# Patient Record
Sex: MALE | Race: AMERICAN INDIAN OR ALASKA NATIVE
[De-identification: names, ages, dates, MRNs, and addresses within clinical notes are randomized per-mention and may not be internally consistent; named-entity substitution may affect disease eponyms.]

---

## 2018-08-28 ENCOUNTER — HOSPITAL ENCOUNTER (EMERGENCY)
Dept: HOSPITAL 5 - ED | Age: 38
Discharge: HOME | End: 2018-08-28
Payer: COMMERCIAL

## 2018-08-28 VITALS — DIASTOLIC BLOOD PRESSURE: 91 MMHG | SYSTOLIC BLOOD PRESSURE: 151 MMHG

## 2018-08-28 DIAGNOSIS — K02.9: Primary | ICD-10-CM

## 2018-08-28 DIAGNOSIS — K06.8: ICD-10-CM

## 2018-08-28 DIAGNOSIS — Z88.0: ICD-10-CM

## 2018-08-28 PROCEDURE — 99282 EMERGENCY DEPT VISIT SF MDM: CPT

## 2018-08-28 NOTE — EMERGENCY DEPARTMENT REPORT
ED ENT HPI





- General


Chief complaint: Dental/Oral


Stated complaint: TOOTHACHE


Time Seen by Provider: 18 19:50


Source: patient


Mode of arrival: Ambulatory


Limitations: No Limitations





- History of Present Illness


Initial comments: 





He states is a 36-year-old American male who presents with right lower dental 

pain.  Patient states feeling to left or to fell out 2 days ago.  Patient 

states now he is experiencing excruciating pain.  He reports pain is 10 out of 

10 on pain scale, sharp and constant.  Patient states he has a dentist 

appointment tomorrow at 9:00.  He tried to get in today with dizziness.  All 

this.  Patient denies OTC swallowing, fever, drooling, shortness of breath, and 

chest pain.


MD complaint: tooth pain


Onset/Timin


-: days(s)


Location: tooth # (#28)





  __________________________














  __________________________





 1 - Dental caries





Severity: severe


Severity scale (0 -10): 10


Quality: aching, constant, other (throbbing)


Consistency: constant


Improves with: none


Worsens with: eating


Context- Dental: history of dental caries


Associated Symptoms: gum swelling, toothache.  denies: fever, cough, pain with 

swallowing, sore throat, tinnitus, hearing loss, discharge from ear, rhinorrhea





- Related Data


 Previous Rx's











 Medication  Instructions  Recorded  Last Taken  Type


 


Acetaminophen/Codeine [Tylenol 1 tab PO Q6H PRN #8 tab 18 Unknown Rx





/Codeine # 3 tab]    











 Allergies











Allergy/AdvReac Type Severity Reaction Status Date / Time


 


Penicillins Allergy  Unknown Verified 18 12:14














ED Dental HPI





- General


Chief complaint: Dental/Oral


Stated complaint: TOOTHACHE


Time Seen by Provider: 18 19:50


Source: patient


Mode of arrival: Ambulatory


Limitations: No Limitations





- Related Data


 Previous Rx's











 Medication  Instructions  Recorded  Last Taken  Type


 


Acetaminophen/Codeine [Tylenol 1 tab PO Q6H PRN #8 tab 18 Unknown Rx





/Codeine # 3 tab]    











 Allergies











Allergy/AdvReac Type Severity Reaction Status Date / Time


 


Penicillins Allergy  Unknown Verified 18 12:14














ED Review of Systems


ROS: 


Stated complaint: TOOTHACHE


Other details as noted in HPI





Constitutional: denies: chills, fever


ENT: dental pain (toothache, right lower side).  denies: ear pain, throat pain


Respiratory: denies: cough, shortness of breath, wheezing


Cardiovascular: denies: chest pain, palpitations


Gastrointestinal: denies: abdominal pain, nausea, diarrhea


Skin: denies: rash, lesions


Neurological: denies: headache, weakness, paresthesias


Psychiatric: denies: anxiety, depression





ED Past Medical Hx





- Past Medical History


Previous Medical History?: No





- Surgical History


Past Surgical History?: No





- Social History


Smoking Status: Never Smoker


Substance Use Type: Alcohol





- Medications


Home Medications: 


 Home Medications











 Medication  Instructions  Recorded  Confirmed  Last Taken  Type


 


Acetaminophen/Codeine [Tylenol 1 tab PO Q6H PRN #8 tab 18  Unknown Rx





/Codeine # 3 tab]     














ED Physical Exam





- General


Limitations: No Limitations


General appearance: alert, in no apparent distress, obese (morbidly obese)





- ENT


ENT exam: Present: mucous membranes moist, other (#28 dental caries, tenderness

, mucosal swelling)





- Respiratory


Respiratory exam: Present: normal lung sounds bilaterally.  Absent: respiratory 

distress





- Cardiovascular


Cardiovascular Exam: Present: regular rate, normal rhythm.  Absent: systolic 

murmur, diastolic murmur, rubs, gallop





- GI/Abdominal


GI/Abdominal exam: Present: soft, normal bowel sounds





- Neurological Exam


Neurological exam: Present: alert, oriented X3





- Psychiatric


Psychiatric exam: Present: normal affect, normal mood





- Skin


Skin exam: Present: warm, dry, intact, normal color.  Absent: rash





ED Course





 Vital Signs











  18





  12:12


 


Temperature 98.8 F


 


Pulse Rate 87


 


Respiratory 18





Rate 


 


Blood Pressure 151/91


 


O2 Sat by Pulse 97





Oximetry 














ED Medical Decision Making





- Medical Decision Making





This is a 38-year-old male that presents with tooth ache and right side facial 

swelling for 2 days.  Patient is stable and was examined by me. Given norco 

once in ER. Susceptible of dental caries. Discussed plan with patient. He 

agreed with ER plan. Discharged home with Tylenol with codeine. Reviewed report 

on [facility], patient has no prescriptions history.  Follow up with dentist in the 

morning. 


Critical care attestation.: 


If time is entered above; I have spent that time in minutes in the direct care 

of this critically ill patient, excluding procedure time.








ED Disposition


Clinical Impression: 


 Toothache, Dental caries





Disposition: - TO HOME OR SELFCARE


Is pt being admited?: No


Does the pt Need Aspirin: No


Condition: Stable


Instructions:  Dental Caries (ED), Toothache (ED)


Additional Instructions: 


Take pain medication every 6 hours as needed for pain.


Follow up with Dentist in 24-72 hours.


Prescriptions: 


Acetaminophen/Codeine [Tylenol /Codeine # 3 tab] 1 tab PO Q6H PRN #8 tab


 PRN Reason: Pain , Severe (7-10)


Referrals: 


[facility] [Outside] - 3-5 Days


Forms:  Work/School Release Form(ED)


Time of Disposition: 20:16


Print Language: ENGLISH

## 2018-08-28 NOTE — EMERGENCY DEPARTMENT REPORT
ED General Adult HPI





- General


Chief complaint: Dental/Oral


Stated complaint: TOOTHACHE


Source: patient


Mode of arrival: Ambulatory


Limitations: No Limitations





- Related Data


 Allergies











Allergy/AdvReac Type Severity Reaction Status Date / Time


 


Penicillins Allergy  Unknown Verified 08/28/18 12:14














ED Review of Systems


ROS: 


Stated complaint: TOOTHACHE


Other details as noted in HPI








ED Past Medical Hx





- Past Medical History


Previous Medical History?: No





- Surgical History


Past Surgical History?: No





- Social History


Smoking Status: Never Smoker


Substance Use Type: Alcohol





ED Physical Exam





- General


Limitations: No Limitations





ED Course





 Vital Signs











  08/28/18





  12:12


 


Temperature 98.8 F


 


Pulse Rate 87


 


Respiratory 18





Rate 


 


Blood Pressure 151/91


 


O2 Sat by Pulse 97





Oximetry 











Critical care attestation.: 


If time is entered above; I have spent that time in minutes in the direct care 

of this critically ill patient, excluding procedure time.








ED Disposition


Condition: Stable


Referrals: 


PRIMARY CARE,MD [Primary Care Provider] - 3-5 Days